# Patient Record
(demographics unavailable — no encounter records)

---

## 2024-10-14 NOTE — PHYSICAL EXAM
[de-identified] : General: Awake, alert, no acute distress, Patient was cooperative and appropriate during the examination.  The patient's weight is normal for height and age.  Walks without an antalgic gait.   Right elbow Exam: Physical exam of the elbow demonstrates normal skin without signs of skin changes or abnormalities. No erythema, warmth, or joint effusion appreciated.   Sensation intact light touch C5-T1 Palpable radial pulse Radial/ulnar/median/axillary/musculocutaneous/AIN/PIN nerves grossly intact  Range of motion: Flexion-Extension 0 to 145 degrees Pronation-Supination 85 degrees of pronation 85 degrees of supination  Palpation: Moderately tender to palpation over the lateral epicondyle and common extensor origin Not tender palpation over the medial epicondyle Not tender to palpation over the radial head Not tender to palpation over the olecranon Not tender to palpation over the distal biceps insertion Not tender to palpation over the distal triceps insertion Not tender to palpation over the cubital tunnel  Strength testing: Elbow Flexion 5/5 Elbow Extension 5/5 Pronation 5/5 Supination 5/5  Special Tests: No varus/valgus laxity at 0 and 30 degrees of elbow flexion Moderate pain with resisted wrist/finger extension and forearm supination No pain with resisted wrist/finger flexion and forearm pronation Negative hook test Negative Tinel's over the carpal and cubital tunnels  Right shoulder Exam: Physical exam of the shoulder demonstrates normal skin without signs of skin changes or abnormalities. No erythema, warmth, or joint effusion appreciated.  Sensation intact light touch C5-T1 Palpable radial pulse Radial/ulnar/median/axillary/musculocutaneous/AIN/PIN nerves grossly intact  Range of motion: Forward Flexion: 175 Abduction: 140 External Rotation: 45 Internal Rotation: L3  Palpation: Not tender to palpation over the glenohumeral joint Mildly tender palpation over the rotator cuff insertion on the greater tuberosity Not tender to palpation over the AC joint Not tender to palpation of the biceps tendon/bicipital groove  Strength testing: Supraspinatus: 5/5 Infraspinatus: 5/5 Subscapularis: 5/5  Special test: Empty can test positive Dee impingement test positive Speeds test negative [place]'s test negative Lift-off test negative Bell-press test negative Cross-arm adduction test negative   [de-identified] : X-rays including 3 views of the right elbow were obtained in the office on 10/14/2024 and reviewed the patient.  There is no acute fracture or dislocation.  No arthritis.

## 2024-10-14 NOTE — HISTORY OF PRESENT ILLNESS
[de-identified] : 10/14/2024: Jazmyn is a pleasant 43-year-old right-hand-dominant female presents to the office today for evaluation of right elbow pain and right shoulder pain.  The patient states her pain began a month or 2 ago but she denies any history of trauma.  The pain is activity related and worse with any lifting or gripping.  She has never had this pain before and has had no formal treatment.  The patient denies any fevers, chills, sweats, recent illnesses, numbness, tingling, weakness, or pain elsewhere at this time.

## 2024-10-14 NOTE — DISCUSSION/SUMMARY
[de-identified] : Assessment: 43-year-old female with right elbow pain secondary to lateral epicondylitis, right shoulder pain secondary to rotator cuff tendinitis  Plan: I had a long discussion with the patient today regarding the nature of their diagnosis and treatment plan. We discussed the risks and benefits of no treatment as well as nonoperative and operative treatments.  I reviewed the patient's elbow x-rays today with her in the office which are negative for any acute pathology.  On examination of the elbow she has isolated tenderness palpation over the lateral epicondyle consistent with tennis elbow.  On examination of the shoulder she has good range of motion and strength with positive impingement signs.  At this time I recommend conservative treatment of the patient's condition with modalities including rest, ice, heat, anti-inflammatory medications, activity modifications, and home stretching and strengthening exercises.  A prescription for Voltaren gel was sent to the patient's pharmacy today.  I discussed with the patient the risks and benefits associated with NSAID use. GI precautions were discussed.  A referral for physical therapy was provided to begin working on exercises to help improve their strength and function.  We discussed obtaining an over-the-counter counterforce strap.  Recommend follow-up in 8 weeks for repeat clinical assessment as needed.  If symptoms persist we may consider imaging of the shoulder and/or elbow versus injections.  The patient verbalizes their understanding and agrees with the plan.  All questions were answered to their satisfaction.   I, Dr. Marie, personally performed the evaluation and management (E/M) services for this new patient.  That E/M includes conducting the clinically appropriate initial history &/or exam, assessing all conditions, and establishing the plan of care.  Today, my LATONIA, was here to observe my evaluation and management service for this patient & follow plan of care established by me going forward.

## 2024-10-14 NOTE — REVIEW OF SYSTEMS
Continue avoidance of gluten.   [Negative] : Heme/Lymph [FreeTextEntry9] : Right elbow pain, right shoulder pain